# Patient Record
Sex: FEMALE | Race: WHITE | NOT HISPANIC OR LATINO | URBAN - METROPOLITAN AREA
[De-identification: names, ages, dates, MRNs, and addresses within clinical notes are randomized per-mention and may not be internally consistent; named-entity substitution may affect disease eponyms.]

---

## 2018-05-01 ENCOUNTER — OUTPATIENT (OUTPATIENT)
Dept: OUTPATIENT SERVICES | Facility: HOSPITAL | Age: 78
LOS: 1 days | End: 2018-05-01
Payer: COMMERCIAL

## 2018-05-01 VITALS
HEART RATE: 82 BPM | SYSTOLIC BLOOD PRESSURE: 155 MMHG | RESPIRATION RATE: 16 BRPM | WEIGHT: 167.99 LBS | TEMPERATURE: 99 F | DIASTOLIC BLOOD PRESSURE: 83 MMHG

## 2018-05-01 DIAGNOSIS — Z01.818 ENCOUNTER FOR OTHER PREPROCEDURAL EXAMINATION: ICD-10-CM

## 2018-05-01 DIAGNOSIS — M17.12 UNILATERAL PRIMARY OSTEOARTHRITIS, LEFT KNEE: ICD-10-CM

## 2018-05-01 DIAGNOSIS — Z90.81 ACQUIRED ABSENCE OF SPLEEN: Chronic | ICD-10-CM

## 2018-05-01 DIAGNOSIS — Z98.890 OTHER SPECIFIED POSTPROCEDURAL STATES: Chronic | ICD-10-CM

## 2018-05-01 DIAGNOSIS — Z41.9 ENCOUNTER FOR PROCEDURE FOR PURPOSES OTHER THAN REMEDYING HEALTH STATE, UNSPECIFIED: Chronic | ICD-10-CM

## 2018-05-01 LAB
ALBUMIN SERPL ELPH-MCNC: 3.4 G/DL — SIGNIFICANT CHANGE UP (ref 3.3–5)
ALP SERPL-CCNC: 87 U/L — SIGNIFICANT CHANGE UP (ref 40–120)
ALT FLD-CCNC: 26 U/L — SIGNIFICANT CHANGE UP (ref 12–78)
ANION GAP SERPL CALC-SCNC: 8 MMOL/L — SIGNIFICANT CHANGE UP (ref 5–17)
APTT BLD: 30.5 SEC — SIGNIFICANT CHANGE UP (ref 27.5–37.4)
AST SERPL-CCNC: 22 U/L — SIGNIFICANT CHANGE UP (ref 15–37)
BILIRUB SERPL-MCNC: 0.7 MG/DL — SIGNIFICANT CHANGE UP (ref 0.2–1.2)
BUN SERPL-MCNC: 11 MG/DL — SIGNIFICANT CHANGE UP (ref 7–23)
CALCIUM SERPL-MCNC: 8.5 MG/DL — SIGNIFICANT CHANGE UP (ref 8.5–10.1)
CHLORIDE SERPL-SCNC: 106 MMOL/L — SIGNIFICANT CHANGE UP (ref 96–108)
CO2 SERPL-SCNC: 26 MMOL/L — SIGNIFICANT CHANGE UP (ref 22–31)
CREAT SERPL-MCNC: 0.54 MG/DL — SIGNIFICANT CHANGE UP (ref 0.5–1.3)
GLUCOSE SERPL-MCNC: 85 MG/DL — SIGNIFICANT CHANGE UP (ref 70–99)
HCT VFR BLD CALC: 42.7 % — SIGNIFICANT CHANGE UP (ref 34.5–45)
HGB BLD-MCNC: 14.3 G/DL — SIGNIFICANT CHANGE UP (ref 11.5–15.5)
INR BLD: 1.05 RATIO — SIGNIFICANT CHANGE UP (ref 0.88–1.16)
MCHC RBC-ENTMCNC: 29.9 PG — SIGNIFICANT CHANGE UP (ref 27–34)
MCHC RBC-ENTMCNC: 33.6 GM/DL — SIGNIFICANT CHANGE UP (ref 32–36)
MCV RBC AUTO: 89 FL — SIGNIFICANT CHANGE UP (ref 80–100)
MRSA PCR RESULT.: SIGNIFICANT CHANGE UP
PLATELET # BLD AUTO: 84 K/UL — LOW (ref 150–400)
POTASSIUM SERPL-MCNC: 4 MMOL/L — SIGNIFICANT CHANGE UP (ref 3.5–5.3)
POTASSIUM SERPL-SCNC: 4 MMOL/L — SIGNIFICANT CHANGE UP (ref 3.5–5.3)
PROT SERPL-MCNC: 7.3 G/DL — SIGNIFICANT CHANGE UP (ref 6–8.3)
PROTHROM AB SERPL-ACNC: 11.5 SEC — SIGNIFICANT CHANGE UP (ref 9.8–12.7)
RBC # BLD: 4.8 M/UL — SIGNIFICANT CHANGE UP (ref 3.8–5.2)
RBC # FLD: 13.1 % — SIGNIFICANT CHANGE UP (ref 10.3–14.5)
S AUREUS DNA NOSE QL NAA+PROBE: SIGNIFICANT CHANGE UP
SODIUM SERPL-SCNC: 140 MMOL/L — SIGNIFICANT CHANGE UP (ref 135–145)
WBC # BLD: 10.7 K/UL — HIGH (ref 3.8–10.5)
WBC # FLD AUTO: 10.7 K/UL — HIGH (ref 3.8–10.5)

## 2018-05-01 PROCEDURE — 86850 RBC ANTIBODY SCREEN: CPT

## 2018-05-01 PROCEDURE — 85730 THROMBOPLASTIN TIME PARTIAL: CPT

## 2018-05-01 PROCEDURE — 80053 COMPREHEN METABOLIC PANEL: CPT

## 2018-05-01 PROCEDURE — 86901 BLOOD TYPING SEROLOGIC RH(D): CPT

## 2018-05-01 PROCEDURE — 85027 COMPLETE CBC AUTOMATED: CPT

## 2018-05-01 PROCEDURE — 87641 MR-STAPH DNA AMP PROBE: CPT

## 2018-05-01 PROCEDURE — 73560 X-RAY EXAM OF KNEE 1 OR 2: CPT | Mod: 26,LT

## 2018-05-01 PROCEDURE — G0463: CPT

## 2018-05-01 PROCEDURE — 93005 ELECTROCARDIOGRAM TRACING: CPT

## 2018-05-01 PROCEDURE — 73560 X-RAY EXAM OF KNEE 1 OR 2: CPT

## 2018-05-01 PROCEDURE — 93010 ELECTROCARDIOGRAM REPORT: CPT

## 2018-05-01 PROCEDURE — 86900 BLOOD TYPING SEROLOGIC ABO: CPT

## 2018-05-01 PROCEDURE — 87640 STAPH A DNA AMP PROBE: CPT

## 2018-05-01 PROCEDURE — 85610 PROTHROMBIN TIME: CPT

## 2018-05-01 RX ORDER — RISEDRONATE SODIUM 25.8; 4.2 MG/1; MG/1
0 TABLET, FILM COATED ORAL
Qty: 3 | Refills: 0 | COMMUNITY

## 2018-05-01 RX ORDER — PANTOPRAZOLE SODIUM 20 MG/1
0 TABLET, DELAYED RELEASE ORAL
Qty: 30 | Refills: 0 | COMMUNITY

## 2018-05-01 RX ORDER — LEVOTHYROXINE SODIUM 125 MCG
0 TABLET ORAL
Qty: 90 | Refills: 0 | COMMUNITY

## 2018-05-01 RX ORDER — AMLODIPINE BESYLATE 2.5 MG/1
0 TABLET ORAL
Qty: 30 | Refills: 0 | COMMUNITY

## 2018-05-01 NOTE — H&P PST ADULT - RS GEN PE MLT RESP DETAILS PC
good air movement/airway patent/normal/breath sounds equal/clear to auscultation bilaterally/respirations non-labored

## 2018-05-01 NOTE — H&P PST ADULT - HEMATOLOGY/LYMPHATICS COMMENTS
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

## 2018-05-01 NOTE — H&P PST ADULT - PMH
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

## 2018-05-01 NOTE — H&P PST ADULT - NEGATIVE CARDIOVASCULAR SYMPTOMS
no dyspnea on exertion/no chest pain/no orthopnea/no paroxysmal nocturnal dyspnea/no claudication/no palpitations/no peripheral edema

## 2018-05-01 NOTE — H&P PST ADULT - MUSCULOSKELETAL COMMENTS
See HPI, OA of right knee Left knee - decreased ROM, (+) mild edema of left knee, no erythema, (+) mild tenderness to palpation

## 2018-05-01 NOTE — H&P PST ADULT - PSH
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

## 2018-05-01 NOTE — H&P PST ADULT - PROBLEM SELECTOR PLAN 2
Medical clearance needed.   CBC, Comprehensive panel, PT/PTT, T&S, EKG, Nose culture and X-ray of left knee ordered.   Pre-op instructions and 3 days of surgical scrubs given and pt verbalized understanding.

## 2018-05-01 NOTE — H&P PST ADULT - LYMPHATIC
posterior cervical L/posterior cervical R/supraclavicular L/anterior cervical L/anterior cervical R/supraclavicular R

## 2018-05-01 NOTE — H&P PST ADULT - NSANTHOSAYNRD_GEN_A_CORE
No. RUDY screening performed.  STOP BANG Legend: 0-2 = LOW Risk; 3-4 = INTERMEDIATE Risk; 5-8 = HIGH Risk

## 2018-05-01 NOTE — H&P PST ADULT - HISTORY OF PRESENT ILLNESS
78yo female with PMH of HTN and ITP here for PST. Pt first started to have left knee pain about 3 years ago and pt was receiving gel injection last in 1/2018. Pt diagnosed with OA of left knee. Pt also complaining of sciatic pain of left leg. Pt rates left knee pain to be 8/10 and pt taking Advil PRN with moderate pain relief. Pt denies left knee swelling and limping gait. Pt electing for left total knee replacement on 5/16/18.

## 2018-05-12 RX ORDER — RISEDRONATE SODIUM 25.8; 4.2 MG/1; MG/1
0 TABLET, FILM COATED ORAL
Qty: 0 | Refills: 0 | COMMUNITY
Start: 2018-05-12

## 2018-05-15 RX ORDER — HYDROMORPHONE HYDROCHLORIDE 2 MG/ML
4 INJECTION INTRAMUSCULAR; INTRAVENOUS; SUBCUTANEOUS EVERY 4 HOURS
Qty: 0 | Refills: 0 | Status: DISCONTINUED | OUTPATIENT
Start: 2018-05-16 | End: 2018-05-19

## 2018-05-15 RX ORDER — CELECOXIB 200 MG/1
200 CAPSULE ORAL
Qty: 0 | Refills: 0 | Status: DISCONTINUED | OUTPATIENT
Start: 2018-05-18 | End: 2018-05-19

## 2018-05-15 RX ORDER — FERROUS SULFATE 325(65) MG
325 TABLET ORAL
Qty: 0 | Refills: 0 | Status: DISCONTINUED | OUTPATIENT
Start: 2018-05-16 | End: 2018-05-19

## 2018-05-15 RX ORDER — AMLODIPINE BESYLATE 2.5 MG/1
5 TABLET ORAL DAILY
Qty: 0 | Refills: 0 | Status: DISCONTINUED | OUTPATIENT
Start: 2018-05-16 | End: 2018-05-19

## 2018-05-15 RX ORDER — SENNA PLUS 8.6 MG/1
2 TABLET ORAL AT BEDTIME
Qty: 0 | Refills: 0 | Status: DISCONTINUED | OUTPATIENT
Start: 2018-05-16 | End: 2018-05-19

## 2018-05-15 RX ORDER — ONDANSETRON 8 MG/1
4 TABLET, FILM COATED ORAL EVERY 6 HOURS
Qty: 0 | Refills: 0 | Status: DISCONTINUED | OUTPATIENT
Start: 2018-05-16 | End: 2018-05-19

## 2018-05-15 RX ORDER — ACETAMINOPHEN 500 MG
650 TABLET ORAL EVERY 6 HOURS
Qty: 0 | Refills: 0 | Status: DISCONTINUED | OUTPATIENT
Start: 2018-05-16 | End: 2018-05-19

## 2018-05-15 RX ORDER — MORPHINE SULFATE 50 MG/1
4 CAPSULE, EXTENDED RELEASE ORAL ONCE
Qty: 0 | Refills: 0 | Status: DISCONTINUED | OUTPATIENT
Start: 2018-05-16 | End: 2018-05-19

## 2018-05-15 RX ORDER — DOCUSATE SODIUM 100 MG
100 CAPSULE ORAL THREE TIMES A DAY
Qty: 0 | Refills: 0 | Status: DISCONTINUED | OUTPATIENT
Start: 2018-05-16 | End: 2018-05-19

## 2018-05-15 RX ORDER — ASCORBIC ACID 60 MG
500 TABLET,CHEWABLE ORAL
Qty: 0 | Refills: 0 | Status: DISCONTINUED | OUTPATIENT
Start: 2018-05-16 | End: 2018-05-19

## 2018-05-15 RX ORDER — SODIUM CHLORIDE 9 MG/ML
1000 INJECTION, SOLUTION INTRAVENOUS
Qty: 0 | Refills: 0 | Status: DISCONTINUED | OUTPATIENT
Start: 2018-05-16 | End: 2018-05-16

## 2018-05-15 RX ORDER — HYDROMORPHONE HYDROCHLORIDE 2 MG/ML
2 INJECTION INTRAMUSCULAR; INTRAVENOUS; SUBCUTANEOUS EVERY 4 HOURS
Qty: 0 | Refills: 0 | Status: DISCONTINUED | OUTPATIENT
Start: 2018-05-16 | End: 2018-05-19

## 2018-05-15 RX ORDER — ASPIRIN/CALCIUM CARB/MAGNESIUM 324 MG
325 TABLET ORAL
Qty: 0 | Refills: 0 | Status: DISCONTINUED | OUTPATIENT
Start: 2018-05-17 | End: 2018-05-19

## 2018-05-15 RX ORDER — FOLIC ACID 0.8 MG
1 TABLET ORAL DAILY
Qty: 0 | Refills: 0 | Status: DISCONTINUED | OUTPATIENT
Start: 2018-05-16 | End: 2018-05-19

## 2018-05-15 RX ORDER — LEVOTHYROXINE SODIUM 125 MCG
50 TABLET ORAL DAILY
Qty: 0 | Refills: 0 | Status: DISCONTINUED | OUTPATIENT
Start: 2018-05-16 | End: 2018-05-19

## 2018-05-16 ENCOUNTER — INPATIENT (INPATIENT)
Facility: HOSPITAL | Age: 78
LOS: 2 days | Discharge: HOME | DRG: 470 | End: 2018-05-19
Attending: ORTHOPAEDIC SURGERY | Admitting: ORTHOPAEDIC SURGERY
Payer: COMMERCIAL

## 2018-05-16 VITALS
HEART RATE: 76 BPM | RESPIRATION RATE: 21 BRPM | WEIGHT: 167.99 LBS | DIASTOLIC BLOOD PRESSURE: 82 MMHG | SYSTOLIC BLOOD PRESSURE: 165 MMHG | TEMPERATURE: 98 F | OXYGEN SATURATION: 97 %

## 2018-05-16 DIAGNOSIS — E03.9 HYPOTHYROIDISM, UNSPECIFIED: ICD-10-CM

## 2018-05-16 DIAGNOSIS — M17.12 UNILATERAL PRIMARY OSTEOARTHRITIS, LEFT KNEE: ICD-10-CM

## 2018-05-16 DIAGNOSIS — K21.9 GASTRO-ESOPHAGEAL REFLUX DISEASE WITHOUT ESOPHAGITIS: ICD-10-CM

## 2018-05-16 DIAGNOSIS — Z41.9 ENCOUNTER FOR PROCEDURE FOR PURPOSES OTHER THAN REMEDYING HEALTH STATE, UNSPECIFIED: Chronic | ICD-10-CM

## 2018-05-16 DIAGNOSIS — Z98.890 OTHER SPECIFIED POSTPROCEDURAL STATES: Chronic | ICD-10-CM

## 2018-05-16 DIAGNOSIS — Z90.81 ACQUIRED ABSENCE OF SPLEEN: Chronic | ICD-10-CM

## 2018-05-16 DIAGNOSIS — I10 ESSENTIAL (PRIMARY) HYPERTENSION: ICD-10-CM

## 2018-05-16 LAB
ABO RH CONFIRMATION: SIGNIFICANT CHANGE UP
HCT VFR BLD CALC: 38 % — SIGNIFICANT CHANGE UP (ref 34.5–45)
HGB BLD-MCNC: 12.4 G/DL — SIGNIFICANT CHANGE UP (ref 11.5–15.5)

## 2018-05-16 PROCEDURE — 73562 X-RAY EXAM OF KNEE 3: CPT | Mod: 26,LT

## 2018-05-16 PROCEDURE — 88311 DECALCIFY TISSUE: CPT | Mod: 26

## 2018-05-16 PROCEDURE — 88305 TISSUE EXAM BY PATHOLOGIST: CPT | Mod: 26

## 2018-05-16 RX ORDER — LEVOTHYROXINE SODIUM 125 MCG
0 TABLET ORAL
Qty: 0 | Refills: 0 | COMMUNITY

## 2018-05-16 RX ORDER — OXYCODONE HYDROCHLORIDE 5 MG/1
5 TABLET ORAL EVERY 4 HOURS
Qty: 0 | Refills: 0 | Status: DISCONTINUED | OUTPATIENT
Start: 2018-05-16 | End: 2018-05-16

## 2018-05-16 RX ORDER — CEFAZOLIN SODIUM 1 G
2000 VIAL (EA) INJECTION ONCE
Qty: 0 | Refills: 0 | Status: COMPLETED | OUTPATIENT
Start: 2018-05-16 | End: 2018-05-16

## 2018-05-16 RX ORDER — ACETAMINOPHEN 500 MG
1000 TABLET ORAL ONCE
Qty: 0 | Refills: 0 | Status: COMPLETED | OUTPATIENT
Start: 2018-05-17 | End: 2018-05-17

## 2018-05-16 RX ORDER — ACETAMINOPHEN 500 MG
1000 TABLET ORAL DAILY
Qty: 0 | Refills: 0 | Status: COMPLETED | OUTPATIENT
Start: 2018-05-16 | End: 2018-05-17

## 2018-05-16 RX ORDER — ONDANSETRON 8 MG/1
4 TABLET, FILM COATED ORAL ONCE
Qty: 0 | Refills: 0 | Status: DISCONTINUED | OUTPATIENT
Start: 2018-05-16 | End: 2018-05-16

## 2018-05-16 RX ORDER — MULTIVIT-MIN/FERROUS GLUCONATE 9 MG/15 ML
1 LIQUID (ML) ORAL
Qty: 0 | Refills: 0 | COMMUNITY

## 2018-05-16 RX ORDER — OMEGA-3 ACID ETHYL ESTERS 1 G
0 CAPSULE ORAL
Qty: 0 | Refills: 0 | COMMUNITY

## 2018-05-16 RX ORDER — SODIUM CHLORIDE 9 MG/ML
1000 INJECTION, SOLUTION INTRAVENOUS
Qty: 0 | Refills: 0 | Status: DISCONTINUED | OUTPATIENT
Start: 2018-05-16 | End: 2018-05-17

## 2018-05-16 RX ORDER — PANTOPRAZOLE SODIUM 20 MG/1
0 TABLET, DELAYED RELEASE ORAL
Qty: 0 | Refills: 0 | COMMUNITY

## 2018-05-16 RX ORDER — BUPIVACAINE 13.3 MG/ML
20 INJECTION, SUSPENSION, LIPOSOMAL INFILTRATION ONCE
Qty: 0 | Refills: 0 | Status: DISCONTINUED | OUTPATIENT
Start: 2018-05-16 | End: 2018-05-16

## 2018-05-16 RX ORDER — HYDROMORPHONE HYDROCHLORIDE 2 MG/ML
0.5 INJECTION INTRAMUSCULAR; INTRAVENOUS; SUBCUTANEOUS
Qty: 0 | Refills: 0 | Status: DISCONTINUED | OUTPATIENT
Start: 2018-05-16 | End: 2018-05-16

## 2018-05-16 RX ORDER — ACETAMINOPHEN 500 MG
1000 TABLET ORAL ONCE
Qty: 0 | Refills: 0 | Status: COMPLETED | OUTPATIENT
Start: 2018-05-16 | End: 2018-05-16

## 2018-05-16 RX ORDER — CEFAZOLIN SODIUM 1 G
2000 VIAL (EA) INJECTION EVERY 8 HOURS
Qty: 0 | Refills: 0 | Status: COMPLETED | OUTPATIENT
Start: 2018-05-16 | End: 2018-05-16

## 2018-05-16 RX ORDER — AMLODIPINE BESYLATE 2.5 MG/1
0 TABLET ORAL
Qty: 0 | Refills: 0 | COMMUNITY

## 2018-05-16 RX ORDER — SODIUM CHLORIDE 9 MG/ML
1000 INJECTION, SOLUTION INTRAVENOUS
Qty: 0 | Refills: 0 | Status: DISCONTINUED | OUTPATIENT
Start: 2018-05-16 | End: 2018-05-16

## 2018-05-16 RX ADMIN — Medication 500 MILLIGRAM(S): at 18:20

## 2018-05-16 RX ADMIN — Medication 325 MILLIGRAM(S): at 13:44

## 2018-05-16 RX ADMIN — Medication 100 MILLIGRAM(S): at 21:31

## 2018-05-16 RX ADMIN — Medication 400 MILLIGRAM(S): at 18:20

## 2018-05-16 RX ADMIN — SODIUM CHLORIDE 75 MILLILITER(S): 9 INJECTION, SOLUTION INTRAVENOUS at 06:43

## 2018-05-16 RX ADMIN — SODIUM CHLORIDE 100 MILLILITER(S): 9 INJECTION, SOLUTION INTRAVENOUS at 09:43

## 2018-05-16 RX ADMIN — Medication 100 MILLIGRAM(S): at 16:19

## 2018-05-16 RX ADMIN — HYDROMORPHONE HYDROCHLORIDE 0.5 MILLIGRAM(S): 2 INJECTION INTRAMUSCULAR; INTRAVENOUS; SUBCUTANEOUS at 10:00

## 2018-05-16 RX ADMIN — Medication 1 MILLIGRAM(S): at 13:44

## 2018-05-16 RX ADMIN — HYDROMORPHONE HYDROCHLORIDE 0.5 MILLIGRAM(S): 2 INJECTION INTRAMUSCULAR; INTRAVENOUS; SUBCUTANEOUS at 10:09

## 2018-05-16 RX ADMIN — Medication 100 MILLIGRAM(S): at 23:32

## 2018-05-16 RX ADMIN — Medication 325 MILLIGRAM(S): at 18:20

## 2018-05-16 RX ADMIN — Medication 100 MILLIGRAM(S): at 13:45

## 2018-05-16 NOTE — PATIENT PROFILE ADULT. - FAMILY HISTORY
Father  Still living? No  CAD (coronary artery disease), Age at diagnosis: Age Unknown     Mother  Still living? No  Family history of ovarian cancer, Age at diagnosis: Age Unknown

## 2018-05-16 NOTE — CONSULT NOTE ADULT - SUBJECTIVE AND OBJECTIVE BOX
Patient is a 77y old  Female who presents with a chief complaint of "Left knee replacement" (16 May 2018 06:23)  feels very well presently,  without any complaints      INTERVAL HPI/OVERNIGHT EVENTS:  T(C): 36.7 (05-16-18 @ 12:42), Max: 36.7 (05-16-18 @ 10:55)  HR: 69 (05-16-18 @ 12:42) (66 - 84)  BP: 123/65 (05-16-18 @ 12:42) (118/50 - 165/82)  RR: 16 (05-16-18 @ 12:42) (12 - 21)  SpO2: 99% (05-16-18 @ 12:42) (96% - 99%)  Wt(kg): --  I&O's Summary    16 May 2018 07:01  -  16 May 2018 13:18  --------------------------------------------------------  IN: 240 mL / OUT: 0 mL / NET: 240 mL        PAST MEDICAL & SURGICAL HISTORY:  Osteoporosis  Hypothyroidism  Hypertension  GERD (gastroesophageal reflux disease)  Idiopathic thrombocythemia: (As per pt, platelet count usually runs from 85-90K, 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 and does not follow up with hematologist)  Unilateral primary osteoarthritis, left knee  Elective surgery: (Partial hysterectomy, Age 42)  History of colonoscopy  S/P arthroscopic knee surgery: (Right, 2017)  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      SOCIAL HISTORY  Alcohol: neg  Tobacco: neg  Illicit substance use:  neg    FAMILY HISTORY:    Home Medications:  Advil 200 mg oral tablet: 1 tab(s) orally every 6 hours, As Needed (16 May 2018 06:23)  AMLODIPINE BESYLATE 5 MG TAB: orally once a day (16 May 2018 06:23)  aspirin 81 mg oral tablet: 1 tab(s) orally once a day (16 May 2018 06:23)  biotin 1000 mcg oral tablet: 1 tab(s) orally once a day (16 May 2018 06:23)  Centrum oral tablet: 1 tab(s) orally once a day (16 May 2018 06:23)  Citracal 250 mg + D 200 intl units oral tablet: orally once a day (16 May 2018 06:23)  Fish Oil oral capsule: orally once a day (16 May 2018 06:23)  LEVOTHYROXINE 50 MCG TABLET: orally once a day (16 May 2018 06:23)  PANTOPRAZOLE SOD DR 40 MG TAB: orally once a day (16 May 2018 06:23)  PreserVision AREDS 2 oral capsule: 1 cap(s) orally 2 times a day (16 May 2018 06:23)  RISEDRONATE SODIUM 150 MG TAB: orally once a month (16 May 2018 06:23)        LABS:                        12.4   x     )-----------( x        ( 16 May 2018 10:00 )             38.0                 MEDICATIONS  (STANDING):  acetaminophen  IVPB. 1000 milliGRAM(s) IV Intermittent once  amLODIPine   Tablet 5 milliGRAM(s) Oral daily  ascorbic acid 500 milliGRAM(s) Oral two times a day  ceFAZolin   IVPB 2000 milliGRAM(s) IV Intermittent every 8 hours  docusate sodium 100 milliGRAM(s) Oral three times a day  ferrous    sulfate 325 milliGRAM(s) Oral three times a day with meals  folic acid 1 milliGRAM(s) Oral daily  lactated ringers. 1000 milliLiter(s) (100 mL/Hr) IV Continuous <Continuous>  levothyroxine 50 MICROGram(s) Oral daily  morphine  - Injectable 4 milliGRAM(s) IV Push once    MEDICATIONS  (PRN):  acetaminophen   Tablet 650 milliGRAM(s) Oral every 6 hours PRN For Temp over 38.3 C (100.94 F)  aluminum hydroxide/magnesium hydroxide/simethicone Suspension 30 milliLiter(s) Oral four times a day PRN Indigestion  HYDROmorphone   Tablet 2 milliGRAM(s) Oral every 4 hours PRN Moderate Pain (4 - 6)  HYDROmorphone   Tablet 4 milliGRAM(s) Oral every 4 hours PRN Severe Pain (7 - 10)  ondansetron Injectable 4 milliGRAM(s) IV Push every 6 hours PRN Nausea and/or Vomiting  senna 2 Tablet(s) Oral at bedtime PRN Constipation      REVIEW OF SYSTEMS:  CONSTITUTIONAL: No fever, weight loss, or fatigue  EYES: No eye pain, visual disturbances, or discharge  ENMT:  No difficulty hearing, tinnitus, vertigo; No sinus or throat pain  NECK: No pain or stiffness  RESPIRATORY: No cough, wheezing, chills or hemoptysis; No shortness of breath  CARDIOVASCULAR: No chest pain, palpitations, dizziness, or leg swelling  GASTROINTESTINAL: No abdominal or epigastric pain. No nausea, vomiting, or hematemesis; No diarrhea or constipation. No melena or hematochezia.  GENITOURINARY: No dysuria, frequency, hematuria, or incontinence  NEUROLOGICAL: No headaches, memory loss, loss of strength, numbness, or tremors  SKIN: No itching, burning, rashes, or lesions   LYMPH NODES: No enlarged glands  ENDOCRINE: No heat or cold intolerance; No hair loss  MUSCULOSKELETAL: chronic bilateral knee pain  PSYCHIATRIC: No depression, anxiety, mood swings, or difficulty sleeping  HEME/LYMPH: No easy bruising, or bleeding gums  ALLERY AND IMMUNOLOGIC: No hives or eczema    RADIOLOGY & ADDITIONAL TESTS:    Imaging Personally Reviewed:  [y ] YES  [ ] NO    Consultant(s) Notes Reviewed:  [y ] YES  [ ] NO        PHYSICAL EXAM:  GENERAL: NAD, well-groomed, well-developed  HEAD:  Atraumatic, Normocephalic  EYES: EOMI, PERRLA, conjunctiva and sclera clear  ENMT: No tonsillar erythema, exudates, or enlargement; Moist mucous membranes, Good dentition, No lesions  NECK: Supple, No JVD, Normal thyroid  NERVOUS SYSTEM:  Alert & Oriented X3, Good concentration; Motor Strength 5/5 B/L upper and lower extremities; DTRs 2+ intact and symmetric  CHEST/LUNG: Clear to percussion bilaterally; No rales, rhonchi, wheezing, or rubs  HEART: Regular rate and rhythm; No murmurs, rubs, or gallops  ABDOMEN: Soft, Nontender, Nondistended; Bowel sounds present  EXTREMITIES:  2+ Peripheral Pulses, No clubbing, cyanosis, or edema, no calf tenderness to palp  LYMPH: No lymphadenopathy noted  SKIN: No rashes or lesions    Care Discussed with Consultants/Other Providers [ ] YES  [ ] NO

## 2018-05-16 NOTE — PROGRESS NOTE ADULT - ASSESSMENT
A/P: 77F s/p L TKA POD 0  Pain Control  DVT ppx  WBAT  PT/OT  Incentive Spirometry  Medical management appreciated  DC planning

## 2018-05-16 NOTE — BRIEF OPERATIVE NOTE - PROCEDURE
<<-----Click on this checkbox to enter Procedure Total knee replacement  05/16/2018  LEFT  Active  [id number]

## 2018-05-16 NOTE — PROGRESS NOTE ADULT - SUBJECTIVE AND OBJECTIVE BOX
Post-op check  Pt S/E at bedside, pain controlled    Vital Signs Last 24 Hrs  T(C): 36.7 (16 May 2018 15:49), Max: 36.7 (16 May 2018 10:55)  T(F): 98.1 (16 May 2018 15:49), Max: 98.1 (16 May 2018 10:55)  HR: 73 (16 May 2018 15:49) (66 - 84)  BP: 130/69 (16 May 2018 15:49) (118/50 - 165/82)  BP(mean): --  RR: 16 (16 May 2018 15:49) (12 - 21)  SpO2: 99% (16 May 2018 15:49) (96% - 99%)    Gen: NAD,     Left Lower Extremity:  Dressing clean dry intact  +EHL/FHL/TA/GS  SILT L3-S1  +DP/PT Pulses  Compartments soft  No calf TTP B/L

## 2018-05-17 LAB
ANION GAP SERPL CALC-SCNC: 7 MMOL/L — SIGNIFICANT CHANGE UP (ref 5–17)
BUN SERPL-MCNC: 14 MG/DL — SIGNIFICANT CHANGE UP (ref 7–23)
CALCIUM SERPL-MCNC: 7.7 MG/DL — LOW (ref 8.5–10.1)
CHLORIDE SERPL-SCNC: 108 MMOL/L — SIGNIFICANT CHANGE UP (ref 96–108)
CO2 SERPL-SCNC: 27 MMOL/L — SIGNIFICANT CHANGE UP (ref 22–31)
CREAT SERPL-MCNC: 0.53 MG/DL — SIGNIFICANT CHANGE UP (ref 0.5–1.3)
GLUCOSE SERPL-MCNC: 122 MG/DL — HIGH (ref 70–99)
HCT VFR BLD CALC: 33 % — LOW (ref 34.5–45)
HGB BLD-MCNC: 11.2 G/DL — LOW (ref 11.5–15.5)
POTASSIUM SERPL-MCNC: 4.7 MMOL/L — SIGNIFICANT CHANGE UP (ref 3.5–5.3)
POTASSIUM SERPL-SCNC: 4.7 MMOL/L — SIGNIFICANT CHANGE UP (ref 3.5–5.3)
SODIUM SERPL-SCNC: 142 MMOL/L — SIGNIFICANT CHANGE UP (ref 135–145)

## 2018-05-17 PROCEDURE — 93971 EXTREMITY STUDY: CPT | Mod: 26,LT

## 2018-05-17 RX ADMIN — Medication 325 MILLIGRAM(S): at 18:35

## 2018-05-17 RX ADMIN — Medication 325 MILLIGRAM(S): at 06:47

## 2018-05-17 RX ADMIN — Medication 1 MILLIGRAM(S): at 12:06

## 2018-05-17 RX ADMIN — Medication 325 MILLIGRAM(S): at 12:06

## 2018-05-17 RX ADMIN — Medication 400 MILLIGRAM(S): at 12:06

## 2018-05-17 RX ADMIN — HYDROMORPHONE HYDROCHLORIDE 4 MILLIGRAM(S): 2 INJECTION INTRAMUSCULAR; INTRAVENOUS; SUBCUTANEOUS at 14:42

## 2018-05-17 RX ADMIN — Medication 500 MILLIGRAM(S): at 18:35

## 2018-05-17 RX ADMIN — Medication 100 MILLIGRAM(S): at 06:47

## 2018-05-17 RX ADMIN — HYDROMORPHONE HYDROCHLORIDE 2 MILLIGRAM(S): 2 INJECTION INTRAMUSCULAR; INTRAVENOUS; SUBCUTANEOUS at 04:34

## 2018-05-17 RX ADMIN — Medication 500 MILLIGRAM(S): at 06:47

## 2018-05-17 RX ADMIN — HYDROMORPHONE HYDROCHLORIDE 4 MILLIGRAM(S): 2 INJECTION INTRAMUSCULAR; INTRAVENOUS; SUBCUTANEOUS at 18:35

## 2018-05-17 RX ADMIN — Medication 100 MILLIGRAM(S): at 14:44

## 2018-05-17 RX ADMIN — HYDROMORPHONE HYDROCHLORIDE 2 MILLIGRAM(S): 2 INJECTION INTRAMUSCULAR; INTRAVENOUS; SUBCUTANEOUS at 09:28

## 2018-05-17 RX ADMIN — Medication 400 MILLIGRAM(S): at 02:39

## 2018-05-17 RX ADMIN — Medication 50 MICROGRAM(S): at 05:59

## 2018-05-17 RX ADMIN — Medication 325 MILLIGRAM(S): at 09:30

## 2018-05-17 RX ADMIN — HYDROMORPHONE HYDROCHLORIDE 4 MILLIGRAM(S): 2 INJECTION INTRAMUSCULAR; INTRAVENOUS; SUBCUTANEOUS at 23:49

## 2018-05-17 RX ADMIN — HYDROMORPHONE HYDROCHLORIDE 2 MILLIGRAM(S): 2 INJECTION INTRAMUSCULAR; INTRAVENOUS; SUBCUTANEOUS at 05:00

## 2018-05-17 RX ADMIN — Medication 1000 MILLIGRAM(S): at 03:01

## 2018-05-17 RX ADMIN — Medication 100 MILLIGRAM(S): at 21:47

## 2018-05-17 RX ADMIN — AMLODIPINE BESYLATE 5 MILLIGRAM(S): 2.5 TABLET ORAL at 06:47

## 2018-05-17 NOTE — DISCHARGE NOTE ADULT - CARE PROVIDER_API CALL
Jose Tran), Orthopaedic Surgery  [address] [place] [address]  Phone: [phone]  Fax: [phone]

## 2018-05-17 NOTE — DISCHARGE NOTE ADULT - PLAN OF CARE
RECOVER FROM SURGERY, PHYSICAL THERAPY WEIGHT BEARING AS TOLERATED.  FOLLOW UP WITH DR. PAYTON THURSDAY 05/31/2018 CALL [phone] FOR AN APPOINTMENT.  KEEP KNEE AQUACEL  DRESSING  ON DRY AND CLEAN, IT WILL BE CHANGED OR REMOVED ON YOUR NEXT OFFICE VISIT.

## 2018-05-17 NOTE — DISCHARGE NOTE ADULT - MEDICATION SUMMARY - MEDICATIONS TO TAKE
I will START or STAY ON the medications listed below when I get home from the hospital:    celecoxib 200 mg oral capsule  -- 1 cap(s) by mouth 2 times a day  -- Indication: For PAIN     HYDROmorphone 2 mg oral tablet  -- 1 tab(s) by mouth every 6 hours, As Needed -for severe pain MDD:4  -- Indication: For PAIN     aspirin 325 mg oral delayed release tablet  -- 1 tab(s) by mouth 2 times a day   -- Indication: For DVT PROPHYLAXIS , PREVENT BLOOD CLOT IN LEGS      RISEDRONATE SODIUM 150 MG TAB  -- orally once a month  -- Indication: For HOME MEDICATION     AMLODIPINE BESYLATE 5 MG TAB  -- orally once a day  -- Indication: For HOME MEDICATION     Fish Oil oral capsule  -- orally once a day  -- Indication: For HOME MEDICATION     PANTOPRAZOLE SOD DR 40 MG TAB  -- orally once a day  -- Indication: For HOME MEDICATION     LEVOTHYROXINE 50 MCG TABLET  -- orally once a day  -- Indication: For HOME MEDICATION     Citracal 250 mg + D 200 intl units oral tablet  -- orally once a day  -- Indication: For HOME MEDICATION     Centrum oral tablet  -- 1 tab(s) by mouth once a day  -- Indication: For HOME MEDICATION     PreserVision AREDS 2 oral capsule  -- 1 cap(s) by mouth 2 times a day  -- Indication: For HOME MEDICATION     biotin 1000 mcg oral tablet  -- 1 tab(s) by mouth once a day  -- Indication: For HOME MEDICATION

## 2018-05-17 NOTE — DISCHARGE NOTE ADULT - MEDICATION SUMMARY - MEDICATIONS TO STOP TAKING
I will STOP taking the medications listed below when I get home from the hospital:    aspirin 81 mg oral tablet  -- 1 tab(s) by mouth once a day    Advil 200 mg oral tablet  -- 1 tab(s) by mouth every 6 hours, As Needed

## 2018-05-17 NOTE — DISCHARGE NOTE ADULT - HOSPITAL COURSE
THIS IS A CASE OF A 78 YO FEMALE EVALUATED IN THE OFFICE DUE TO LEFT KNEE PAIN.    PAST MEDICAL HISTORY: HTN, HYPOTHYROIDISM, OSTEOPOROSIS, GERD, IDIOPATHIC THROMBOCYTOPENIA POST SPLENECTOMY, OA        HOSPITAL COURSE: AFTER THE RISK AND BENEFITS OF SURGICAL INTERVENTION IN DETAILS WERE DISCUSSED WITH THE PATIENT, A CONSENT WAS OBTAINED. AFTER OBTAINING MEDICAL CLEARANCE AND PREOPERATIVE EVALUATION, THE PATIENT WAS TAKEN TO THE OPERATING ROOM ON 05/16/2018 AND THE PATIENT UNDERWENT A  LEFT TOTAL KNEE REPLACEMENT. POSTOPERATIVE PHASE, THE PATIENT WAS ANTICOAGULATED WITH ASPIRIN AND WAS GIVEN 24 HOURS OF IV ANTIBIOTICS. A SOCIAL SERVICE CONSULT WAS OBTAINED FOR DISCHARGE PLANNING. A PHYSICAL THERAPY CONSULT WAS OBTAINED FOR  WEIGHT BEARING AS TOLERATED.   DUE TO ANEMIA OF ACUTE BLOOD LOSS POST OP  IRON SUPPLEMENT GIVEN.     DISPOSITION : HOME WITH HOME CARE  AND FOLLOW UP WITH DR. PAYTON AS OUTPATIENT.

## 2018-05-17 NOTE — DISCHARGE NOTE ADULT - CARE PLAN
Principal Discharge DX:	Unilateral primary osteoarthritis, left knee  Goal:	RECOVER FROM SURGERY, PHYSICAL THERAPY  Assessment and plan of treatment:	WEIGHT BEARING AS TOLERATED.  FOLLOW UP WITH DR. PAYTON THURSDAY 05/31/2018 CALL [phone] FOR AN APPOINTMENT.  KEEP KNEE AQUACEL  DRESSING  ON DRY AND CLEAN, IT WILL BE CHANGED OR REMOVED ON YOUR NEXT OFFICE VISIT.

## 2018-05-17 NOTE — PROGRESS NOTE ADULT - SUBJECTIVE AND OBJECTIVE BOX
DEPARTMENT OF ANESTHESIA  POST ANESTHETIC EVALUATION    The Patient was interviewed and evaluated.  The patient is S/P a left total knee    Vital Signs Last 24 Hrs  T(C): 37.1 (17 May 2018 07:25), Max: 37.1 (17 May 2018 07:25)  T(F): 98.7 (17 May 2018 07:25), Max: 98.7 (17 May 2018 07:25)  HR: 69 (17 May 2018 07:25) (66 - 84)  BP: 131/70 (17 May 2018 07:25) (118/50 - 144/80)  BP(mean): --  RR: 16 (17 May 2018 07:25) (12 - 20)  SpO2: 94% (17 May 2018 07:25) (94% - 99%)    Evaluation:  The patient is doing well.      (x ) No apparent complications or complaints regarding anesthesia care at this time  (x) All questions were answered    Condition:  (x Stable      ( ) Guarded      ( ) Critical    Recommendations:  (x None     ( ) Other:

## 2018-05-17 NOTE — PROGRESS NOTE ADULT - SUBJECTIVE AND OBJECTIVE BOX
VANDANAJAUN PABLO HAHN 77y Female  MRN-[id number]     ORTHOPEDIC SURGERY / DR. PAYTON    POD # 1    Vital Signs Last 24 Hrs  T(C): 36.7 (17 May 2018 04:52), Max: 36.7 (16 May 2018 10:55)  T(F): 98.1 (17 May 2018 04:52), Max: 98.1 (16 May 2018 10:55)  HR: 72 (17 May 2018 04:52) (66 - 84)  BP: 132/85 (17 May 2018 04:52) (118/50 - 165/82)  BP(mean): --  RR: 16 (17 May 2018 04:52) (12 - 21)  SpO2: 95% (17 May 2018 04:52) (95% - 99%)    LEFT KNEE :    DRESSING DRY AND INTACT  GOOD MOTOR TO LEFT LOWER EXTREMITY  NEURO-VASCULAR STATUS INTACT  NO CALF TENDERNESS    Hemoglobin: 11.2 (05-17 @ 04:46)  Hemoglobin: 12.4 (05-16 @ 10:00)    Hematocrit: 33.0 (05-17 @ 04:46)  Hematocrit: 38.0 (05-16 @ 10:00)    05-17    142  |  108  |  14  ----------------------------<  122<H>  4.7   |  27  |  0.53    Ca    7.7<L>      17 May 2018 04:45    ASSESSMENT &  PLAN:  POD # 1  S/P LEFT TOTAL KNEE  REPLACEMENT    WEIGHT  BEARING AS TOLERATED, OOB AND AMBULATE, PHYSICAL THERAPY   DVT PROPHYLAXIS  MG PO BID  INCENTIVE SPIROMETRY   DISCHARGE PLANNING TO HOME WITH HOME CARE / REHAB PENDING PT EVALUATION TODAY

## 2018-05-17 NOTE — PROGRESS NOTE ADULT - SUBJECTIVE AND OBJECTIVE BOX
Patient is a 77y old  Female who presents with a chief complaint of "Left knee replacement" (16 May 2018 06:23)  complains of left calf pain post PT  denies chest pain or shortness of breath      INTERVAL HPI/OVERNIGHT EVENTS:  T(C): 37.1 (05-17-18 @ 14:23), Max: 37.1 (05-17-18 @ 07:25)  HR: 79 (05-17-18 @ 14:23) (69 - 79)  BP: 119/72 (05-17-18 @ 14:23) (119/72 - 144/80)  RR: 16 (05-17-18 @ 14:23) (16 - 16)  SpO2: 95% (05-17-18 @ 14:23) (94% - 99%)  Wt(kg): --  I&O's Summary    16 May 2018 07:01  -  17 May 2018 07:00  --------------------------------------------------------  IN: 360 mL / OUT: 1450 mL / NET: -1090 mL        LABS:                        11.2   x     )-----------( x        ( 17 May 2018 04:46 )             33.0     05-17    142  |  108  |  14  ----------------------------<  122<H>  4.7   |  27  |  0.53    Ca    7.7<L>      17 May 2018 04:45          MEDICATIONS  (STANDING):  amLODIPine   Tablet 5 milliGRAM(s) Oral daily  ascorbic acid 500 milliGRAM(s) Oral two times a day  aspirin enteric coated 325 milliGRAM(s) Oral two times a day  docusate sodium 100 milliGRAM(s) Oral three times a day  ferrous    sulfate 325 milliGRAM(s) Oral three times a day with meals  folic acid 1 milliGRAM(s) Oral daily  levothyroxine 50 MICROGram(s) Oral daily  morphine  - Injectable 4 milliGRAM(s) IV Push once    MEDICATIONS  (PRN):  acetaminophen   Tablet 650 milliGRAM(s) Oral every 6 hours PRN For Temp over 38.3 C (100.94 F)  aluminum hydroxide/magnesium hydroxide/simethicone Suspension 30 milliLiter(s) Oral four times a day PRN Indigestion  HYDROmorphone   Tablet 2 milliGRAM(s) Oral every 4 hours PRN Moderate Pain (4 - 6)  HYDROmorphone   Tablet 4 milliGRAM(s) Oral every 4 hours PRN Severe Pain (7 - 10)  ondansetron Injectable 4 milliGRAM(s) IV Push every 6 hours PRN Nausea and/or Vomiting  senna 2 Tablet(s) Oral at bedtime PRN Constipation      REVIEW OF SYSTEMS:  CONSTITUTIONAL: No fever, weight loss, or fatigue  EYES: No eye pain, visual disturbances, or discharge  ENMT:  No difficulty hearing, tinnitus, vertigo; No sinus or throat pain  NECK: No pain or stiffness  RESPIRATORY: No cough, wheezing, chills or hemoptysis; No shortness of breath  CARDIOVASCULAR: No chest pain, palpitations, dizziness, or leg swelling  GASTROINTESTINAL: No abdominal or epigastric pain. No nausea, vomiting, or hematemesis; No diarrhea or constipation. No melena or hematochezia.  GENITOURINARY: No dysuria, frequency, hematuria, or incontinence  NEUROLOGICAL: No headaches, memory loss, loss of strength, numbness, or tremors  SKIN: No itching, burning, rashes, or lesions   LYMPH NODES: No enlarged glands  ENDOCRINE: No heat or cold intolerance; No hair loss  MUSCULOSKELETAL: chronic b/l knee pain  PSYCHIATRIC: No depression, anxiety, mood swings, or difficulty sleeping  HEME/LYMPH: No easy bruising, or bleeding gums  ALLERY AND IMMUNOLOGIC: No hives or eczema    RADIOLOGY & ADDITIONAL TESTS:    Imaging Personally Reviewed:  [ ] YES  [ ] NO    Consultant(s) Notes Reviewed:  [ ] YES  [ ] NO    PHYSICAL EXAM:  GENERAL: NAD, well-groomed, well-developed  HEAD:  Atraumatic, Normocephalic  EYES: EOMI, PERRLA, conjunctiva and sclera clear  ENMT: No tonsillar erythema, exudates, or enlargement; Moist mucous membranes, Good dentition, No lesions  NECK: Supple, No JVD, Normal thyroid  NERVOUS SYSTEM:  Alert & Oriented X3, Good concentration; Motor Strength 5/5 B/L upper and lower extremities; DTRs 2+ intact and symmetric  CHEST/LUNG: Clear to percussion bilaterally; No rales, rhonchi, wheezing, or rubs  HEART: Regular rate and rhythm; No murmurs, rubs, or gallops  ABDOMEN: Soft, Nontender, Nondistended; Bowel sounds present  EXTREMITIES:  left calf tender to palp  LYMPH: No lymphadenopathy noted  SKIN: No rashes or lesions    Care Discussed with Consultants/Other Providers [y ] YES  [ ] NO

## 2018-05-17 NOTE — DISCHARGE NOTE ADULT - PATIENT PORTAL LINK FT
You can access the [facility] Patient Portal, offered by [facility], by registering with the following website: [email or web address]

## 2018-05-17 NOTE — DISCHARGE NOTE ADULT - DURABLE MEDICAL EQUIPMENT AGENCY
Rolling walker and commode from [facility]-  [phone]

## 2018-05-18 LAB
HCT VFR BLD CALC: 30.3 % — LOW (ref 34.5–45)
HGB BLD-MCNC: 10.5 G/DL — LOW (ref 11.5–15.5)
SURGICAL PATHOLOGY FINAL REPORT - CH: SIGNIFICANT CHANGE UP

## 2018-05-18 RX ADMIN — Medication 50 MICROGRAM(S): at 05:05

## 2018-05-18 RX ADMIN — Medication 100 MILLIGRAM(S): at 21:13

## 2018-05-18 RX ADMIN — HYDROMORPHONE HYDROCHLORIDE 4 MILLIGRAM(S): 2 INJECTION INTRAMUSCULAR; INTRAVENOUS; SUBCUTANEOUS at 00:40

## 2018-05-18 RX ADMIN — Medication 325 MILLIGRAM(S): at 17:38

## 2018-05-18 RX ADMIN — HYDROMORPHONE HYDROCHLORIDE 4 MILLIGRAM(S): 2 INJECTION INTRAMUSCULAR; INTRAVENOUS; SUBCUTANEOUS at 06:09

## 2018-05-18 RX ADMIN — Medication 500 MILLIGRAM(S): at 17:39

## 2018-05-18 RX ADMIN — Medication 500 MILLIGRAM(S): at 06:09

## 2018-05-18 RX ADMIN — Medication 325 MILLIGRAM(S): at 11:39

## 2018-05-18 RX ADMIN — Medication 325 MILLIGRAM(S): at 06:09

## 2018-05-18 RX ADMIN — Medication 1 MILLIGRAM(S): at 11:39

## 2018-05-18 RX ADMIN — HYDROMORPHONE HYDROCHLORIDE 4 MILLIGRAM(S): 2 INJECTION INTRAMUSCULAR; INTRAVENOUS; SUBCUTANEOUS at 11:33

## 2018-05-18 RX ADMIN — HYDROMORPHONE HYDROCHLORIDE 4 MILLIGRAM(S): 2 INJECTION INTRAMUSCULAR; INTRAVENOUS; SUBCUTANEOUS at 21:13

## 2018-05-18 RX ADMIN — CELECOXIB 200 MILLIGRAM(S): 200 CAPSULE ORAL at 06:09

## 2018-05-18 RX ADMIN — Medication 100 MILLIGRAM(S): at 06:09

## 2018-05-18 RX ADMIN — HYDROMORPHONE HYDROCHLORIDE 4 MILLIGRAM(S): 2 INJECTION INTRAMUSCULAR; INTRAVENOUS; SUBCUTANEOUS at 22:30

## 2018-05-18 RX ADMIN — Medication 30 MILLILITER(S): at 12:27

## 2018-05-18 RX ADMIN — CELECOXIB 200 MILLIGRAM(S): 200 CAPSULE ORAL at 21:13

## 2018-05-18 RX ADMIN — Medication 100 MILLIGRAM(S): at 17:38

## 2018-05-18 RX ADMIN — AMLODIPINE BESYLATE 5 MILLIGRAM(S): 2.5 TABLET ORAL at 06:09

## 2018-05-18 RX ADMIN — HYDROMORPHONE HYDROCHLORIDE 4 MILLIGRAM(S): 2 INJECTION INTRAMUSCULAR; INTRAVENOUS; SUBCUTANEOUS at 05:05

## 2018-05-18 RX ADMIN — SENNA PLUS 2 TABLET(S): 8.6 TABLET ORAL at 21:14

## 2018-05-18 NOTE — PROGRESS NOTE ADULT - ATTENDING COMMENTS
pt pain well controled   pt will be d/c home tomorrow to follow up with pcp and ortho   home care pt

## 2018-05-18 NOTE — OCCUPATIONAL THERAPY INITIAL EVALUATION ADULT - LONG TERM MEMORY, REHAB EVAL
[address]            (For Outpatient Use Only) Initial Admit Date: 10/21/2017   Inpt/Obs Admit Date: Inpt: 10/21/17 / Obs: N/A   Discharge Date:    Hospital Acct:  [de-identified]   MRN: [de-identified]   CSN: [id number]        [facility] October 23, 2017 intact

## 2018-05-18 NOTE — PROGRESS NOTE ADULT - SUBJECTIVE AND OBJECTIVE BOX
VANDANAJUAN PABLO HAHN 77y Female  MRN-[id number]     ORTHOPEDIC SURGERY / DR. PAYTON    POD # 2    Vital Signs Last 24 Hrs  T(C): 36.7 (18 May 2018 04:41), Max: 37.8 (17 May 2018 23:35)  T(F): 98.1 (18 May 2018 04:41), Max: 100.1 (17 May 2018 23:35)  HR: 82 (18 May 2018 06:08) (69 - 93)  BP: 112/71 (18 May 2018 06:08) (111/68 - 160/67)  BP(mean): --  RR: 16 (18 May 2018 06:08) (16 - 17)  SpO2: 93% (18 May 2018 06:08) (93% - 98%)    LEFT KNEE :    DRESSING DRY AND INTACT  SOME EDEMA  GOOD MOTOR TO LEFT LOWER EXTREMITY  NEURO-VASCULAR STATUS INTACT  NO CALF TENDERNESS    Hemoglobin: 10.5 (05-18 @ 05:47)  Hemoglobin: 11.2 (05-17 @ 04:46)  Hemoglobin: 12.4 (05-16 @ 10:00)    Hematocrit: 30.3 (05-18 @ 05:47)  Hematocrit: 33.0 (05-17 @ 04:46)  Hematocrit: 38.0 (05-16 @ 10:00)    05-17    142  |  108  |  14  ----------------------------<  122<H>  4.7   |  27  |  0.53    Ca    7.7<L>      17 May 2018 04:45    ASSESSMENT &  PLAN:  POD # 2 S/P LEFT TOTAL KNEE  REPLACEMENT    WEIGHT  BEARING AS TOLERATED, OOB AND AMBULATE, PHYSICAL THERAPY   DVT PROPHYLAXIS  MG PO BID  INCENTIVE SPIROMETRY   DISCHARGE PLANNING TO HOME WITH HOME CARE IN AM

## 2018-05-18 NOTE — PROGRESS NOTE ADULT - SUBJECTIVE AND OBJECTIVE BOX
Patient is a 77y old  Female who presents with a chief complaint of "Left knee replacement" (17 May 2018 15:29)      INTERVAL HPI/OVERNIGHT EVENTS:  T(C): 37.1 (05-18-18 @ 15:57), Max: 37.8 (05-17-18 @ 23:35)  HR: 86 (05-18-18 @ 15:57) (80 - 93)  BP: 136/74 (05-18-18 @ 15:57) (111/68 - 173/73)  RR: 16 (05-18-18 @ 15:57) (16 - 17)  SpO2: 97% (05-18-18 @ 15:57) (93% - 98%)  Wt(kg): --  I&O's Summary      LABS:                        10.5   x     )-----------( x        ( 18 May 2018 05:47 )             30.3     05-17    142  |  108  |  14  ----------------------------<  122<H>  4.7   |  27  |  0.53    Ca    7.7<L>      17 May 2018 04:45          CAPILLARY BLOOD GLUCOSE                MEDICATIONS  (STANDING):  amLODIPine   Tablet 5 milliGRAM(s) Oral daily  ascorbic acid 500 milliGRAM(s) Oral two times a day  aspirin enteric coated 325 milliGRAM(s) Oral two times a day  celecoxib 200 milliGRAM(s) Oral two times a day  docusate sodium 100 milliGRAM(s) Oral three times a day  ferrous    sulfate 325 milliGRAM(s) Oral three times a day with meals  folic acid 1 milliGRAM(s) Oral daily  levothyroxine 50 MICROGram(s) Oral daily  morphine  - Injectable 4 milliGRAM(s) IV Push once    MEDICATIONS  (PRN):  acetaminophen   Tablet 650 milliGRAM(s) Oral every 6 hours PRN For Temp over 38.3 C (100.94 F)  aluminum hydroxide/magnesium hydroxide/simethicone Suspension 30 milliLiter(s) Oral four times a day PRN Indigestion  HYDROmorphone   Tablet 2 milliGRAM(s) Oral every 4 hours PRN Moderate Pain (4 - 6)  HYDROmorphone   Tablet 4 milliGRAM(s) Oral every 4 hours PRN Severe Pain (7 - 10)  ondansetron Injectable 4 milliGRAM(s) IV Push every 6 hours PRN Nausea and/or Vomiting  senna 2 Tablet(s) Oral at bedtime PRN Constipation      REVIEW OF SYSTEMS:  CONSTITUTIONAL: No fever, weight loss, or fatigue  EYES: No eye pain, visual disturbances, or discharge  ENMT:  No difficulty hearing, tinnitus, vertigo; No sinus or throat pain  NECK: No pain or stiffness  RESPIRATORY: No cough, wheezing, chills or hemoptysis; No shortness of breath  CARDIOVASCULAR: No chest pain, palpitations, dizziness, or leg swelling  GASTROINTESTINAL: No abdominal or epigastric pain. No nausea, vomiting, or hematemesis; No diarrhea or constipation. No melena or hematochezia.  LYMPH NODES: No enlarged glands  ENDOCRINE: No heat or cold intolerance; No hair loss  MUSCULOSKELETAL: No joint pain or swelling; No muscle, back, or extremity pain      RADIOLOGY & ADDITIONAL TESTS:    Imaging Personally Reviewed:  [ ] YES  [ ] NO    Consultant(s) Notes Reviewed:  [ ] YES  [ ] NO    PHYSICAL EXAM:  GENERAL: NAD, well-groomed, well-developed  HEAD:  Atraumatic, Normocephalic  EYES: EOMI, PERRLA, conjunctiva and sclera clear  ENMT: No tonsillar erythema, exudates, or enlargement; Moist mucous membranes, Good dentition, No lesions  NECK: Supple, No JVD, Normal thyroid  NERVOUS SYSTEM:  Alert & Oriented X3, Good concentration; Motor Strength 5/5 B/L upper and lower extremities; DTRs 2+ intact and symmetric  CHEST/LUNG: Clear to percussion bilaterally; No rales, rhonchi, wheezing, or rubs  HEART: Regular rate and rhythm; No murmurs, rubs, or gallops  EXTREMITIES:  2+ Peripheral Pulses, No clubbing, cyanosis, or edema      Care Discussed with Consultants/Other Providers [ ] YES  [ ] NO

## 2018-05-18 NOTE — OCCUPATIONAL THERAPY INITIAL EVALUATION ADULT - TRANSFER TRAINING, PT EVAL
Patient will transfer to toilet at a modified independent level and stall shower with DME as needed with supervision in 1-3 sessions.

## 2018-05-18 NOTE — OCCUPATIONAL THERAPY INITIAL EVALUATION ADULT - ADDITIONAL COMMENTS
Pt lives in a 1st level co-op with +curb to enter and no steps outside/inside co-op. Pt with +stall shower with built-in seat and grab bar. Pt does not own any DME.  Pt reports that her spouse can assist her at home with ADL's prn.

## 2018-05-19 VITALS
HEART RATE: 82 BPM | SYSTOLIC BLOOD PRESSURE: 130 MMHG | DIASTOLIC BLOOD PRESSURE: 73 MMHG | TEMPERATURE: 98 F | RESPIRATION RATE: 18 BRPM | OXYGEN SATURATION: 95 %

## 2018-05-19 RX ORDER — ASPIRIN/CALCIUM CARB/MAGNESIUM 324 MG
1 TABLET ORAL
Qty: 60 | Refills: 0 | OUTPATIENT
Start: 2018-05-19 | End: 2018-06-17

## 2018-05-19 RX ORDER — IBUPROFEN 200 MG
1 TABLET ORAL
Qty: 0 | Refills: 0 | COMMUNITY

## 2018-05-19 RX ORDER — CELECOXIB 200 MG/1
1 CAPSULE ORAL
Qty: 0 | Refills: 0 | COMMUNITY
Start: 2018-05-19

## 2018-05-19 RX ORDER — ASPIRIN/CALCIUM CARB/MAGNESIUM 324 MG
1 TABLET ORAL
Qty: 0 | Refills: 0 | COMMUNITY

## 2018-05-19 RX ORDER — HYDROMORPHONE HYDROCHLORIDE 2 MG/ML
1 INJECTION INTRAMUSCULAR; INTRAVENOUS; SUBCUTANEOUS
Qty: 20 | Refills: 0 | OUTPATIENT
Start: 2018-05-19 | End: 2018-05-23

## 2018-05-19 RX ADMIN — Medication 500 MILLIGRAM(S): at 05:38

## 2018-05-19 RX ADMIN — HYDROMORPHONE HYDROCHLORIDE 4 MILLIGRAM(S): 2 INJECTION INTRAMUSCULAR; INTRAVENOUS; SUBCUTANEOUS at 05:38

## 2018-05-19 RX ADMIN — AMLODIPINE BESYLATE 5 MILLIGRAM(S): 2.5 TABLET ORAL at 05:38

## 2018-05-19 RX ADMIN — Medication 325 MILLIGRAM(S): at 05:38

## 2018-05-19 RX ADMIN — Medication 50 MICROGRAM(S): at 05:38

## 2018-05-19 RX ADMIN — CELECOXIB 200 MILLIGRAM(S): 200 CAPSULE ORAL at 05:38

## 2018-05-19 RX ADMIN — Medication 100 MILLIGRAM(S): at 05:38

## 2018-05-19 NOTE — PROGRESS NOTE ADULT - SUBJECTIVE AND OBJECTIVE BOX
ALEXJUAN PABLO 77y Female  MRN-[id number]     ORTHOPEDIC SURGERY / DR. PAYTON    POD # 3    Vital Signs Last 24 Hrs  T(C): 37 (18 May 2018 23:36), Max: 37.2 (18 May 2018 08:19)  T(F): 98.6 (18 May 2018 23:36), Max: 98.9 (18 May 2018 08:19)  HR: 84 (19 May 2018 05:33) (80 - 88)  BP: 139/68 (19 May 2018 05:33) (112/66 - 173/73)  BP(mean): --  RR: 16 (19 May 2018 05:33) (16 - 16)  SpO2: 93% (19 May 2018 05:33) (93% - 98%)    LEFT KNEE :    WOUND DRY AND INTACT  SOME EDEMA AND ECCCHYMOSIS  GOOD MOTOR TO LEFT LOWER EXTREMITY  NEURO-VASCULAR STATUS INTACT  NO CALF TENDERNESS    Hemoglobin: 10.5 (05-18 @ 05:47)  Hemoglobin: 11.2 (05-17 @ 04:46)  Hemoglobin: 12.4 (05-16 @ 10:00)    Hematocrit: 30.3 (05-18 @ 05:47)  Hematocrit: 33.0 (05-17 @ 04:46)  Hematocrit: 38.0 (05-16 @ 10:00)    ASSESSMENT &  PLAN:  POD # 3 S/P LEFT TOTAL KNEE  REPLACEMENT    WEIGHT  BEARING AS TOLERATED, OOB AND AMBULATE, PHYSICAL THERAPY   DVT PROPHYLAXIS  MG PO BID  INCENTIVE SPIROMETRY   DISCHARGE PLANNING TO HOME WITH HOME CARE   NEW AQUACEL DRESSING APPLIED

## 2018-05-19 NOTE — PROGRESS NOTE ADULT - SUBJECTIVE AND OBJECTIVE BOX
Patient is a 77y old  Female who presents with a chief complaint of "Left knee replacement" (17 May 2018 15:29)      INTERVAL HPI/OVERNIGHT EVENTS: Patient seen and examined. NAD. No complaints.      Vital Signs Last 24 Hrs  T(C): 36.9 (19 May 2018 07:41), Max: 37.1 (18 May 2018 15:57)  T(F): 98.4 (19 May 2018 07:41), Max: 98.7 (18 May 2018 15:57)  HR: 82 (19 May 2018 07:41) (82 - 88)  BP: 130/73 (19 May 2018 07:41) (112/66 - 139/68)  BP(mean): --  RR: 18 (19 May 2018 07:41) (16 - 18)  SpO2: 95% (19 May 2018 07:41) (93% - 97%)                                10.5   x     )-----------( x        ( 18 May 2018 05:47 )             30.3       CAPILLARY BLOOD GLUCOSE                  acetaminophen   Tablet 650 milliGRAM(s) Oral every 6 hours PRN  aluminum hydroxide/magnesium hydroxide/simethicone Suspension 30 milliLiter(s) Oral four times a day PRN  amLODIPine   Tablet 5 milliGRAM(s) Oral daily  ascorbic acid 500 milliGRAM(s) Oral two times a day  aspirin enteric coated 325 milliGRAM(s) Oral two times a day  celecoxib 200 milliGRAM(s) Oral two times a day  docusate sodium 100 milliGRAM(s) Oral three times a day  ferrous    sulfate 325 milliGRAM(s) Oral three times a day with meals  folic acid 1 milliGRAM(s) Oral daily  HYDROmorphone   Tablet 2 milliGRAM(s) Oral every 4 hours PRN  HYDROmorphone   Tablet 4 milliGRAM(s) Oral every 4 hours PRN  levothyroxine 50 MICROGram(s) Oral daily  morphine  - Injectable 4 milliGRAM(s) IV Push once  ondansetron Injectable 4 milliGRAM(s) IV Push every 6 hours PRN  senna 2 Tablet(s) Oral at bedtime PRN      REVIEW OF SYSTEMS:  CONSTITUTIONAL: No fever, weight loss, or fatigue  EYES: No eye pain, visual disturbances, or discharge  ENMT:  No difficulty hearing, tinnitus, vertigo; No sinus or throat pain  NECK: No pain or stiffness  RESPIRATORY: No cough, wheezing, chills or hemoptysis; No shortness of breath  CARDIOVASCULAR: No chest pain, palpitations, dizziness, or leg swelling  GASTROINTESTINAL: No abdominal or epigastric pain. No nausea, vomiting, or hematemesis; No diarrhea or constipation. No melena or hematochezia.  LYMPH NODES: No enlarged glands  ENDOCRINE: No heat or cold intolerance; No hair loss  MUSCULOSKELETAL: No joint pain or swelling; No muscle, back, or extremity pain      PHYSICAL EXAM:  GENERAL: NAD, well-groomed, well-developed  HEAD:  Atraumatic, Normocephalic  EYES: EOMI, PERRLA, conjunctiva and sclera clear  ENMT: No tonsillar erythema, exudates, or enlargement; Moist mucous membranes, Good dentition, No lesions  NECK: Supple, No JVD, Normal thyroid  NERVOUS SYSTEM:  Alert & Oriented X3, Good concentration; Motor Strength 5/5 B/L upper and lower extremities; DTRs 2+ intact and symmetric  CHEST/LUNG: Clear to percussion bilaterally; No rales, rhonchi, wheezing, or rubs  HEART: Regular rate and rhythm; No murmurs, rubs, or gallops  EXTREMITIES:  2+ Peripheral Pulses, No clubbing, cyanosis, or edema    Advanced care planning discussed with patient/family. Advanced care planning forms reviewed/discussed/completed. 20 minutes spent.

## 2018-05-19 NOTE — PROGRESS NOTE ADULT - ATTENDING COMMENTS
pt pain well controlled   pt will be d/c home today to follow up with pcp and ortho   home care pt  medically stable

## 2018-06-16 NOTE — OCCUPATIONAL THERAPY INITIAL EVALUATION ADULT - DIAGNOSIS, OT EVAL
Patient with decreased ADL status and impairments with functional mobility. Post-application: Motor, sensory, and vascular responses intact in the injured extremity./The patient/caregiver verbalized understanding of how to care for the injured extremity with splint/Pre-application: Motor, sensory, and vascular responses intact in the injured extremity.

## 2018-09-28 PROCEDURE — C1889: CPT

## 2018-09-28 PROCEDURE — 80048 BASIC METABOLIC PNL TOTAL CA: CPT

## 2018-09-28 PROCEDURE — 85018 HEMOGLOBIN: CPT

## 2018-09-28 PROCEDURE — 97162 PT EVAL MOD COMPLEX 30 MIN: CPT

## 2018-09-28 PROCEDURE — 88311 DECALCIFY TISSUE: CPT

## 2018-09-28 PROCEDURE — C1776: CPT

## 2018-09-28 PROCEDURE — 97116 GAIT TRAINING THERAPY: CPT

## 2018-09-28 PROCEDURE — 88305 TISSUE EXAM BY PATHOLOGIST: CPT

## 2018-09-28 PROCEDURE — 73562 X-RAY EXAM OF KNEE 3: CPT

## 2018-09-28 PROCEDURE — 97535 SELF CARE MNGMENT TRAINING: CPT

## 2018-09-28 PROCEDURE — 97165 OT EVAL LOW COMPLEX 30 MIN: CPT

## 2018-09-28 PROCEDURE — C1713: CPT

## 2018-09-28 PROCEDURE — 93971 EXTREMITY STUDY: CPT

## 2018-09-28 PROCEDURE — 97530 THERAPEUTIC ACTIVITIES: CPT

## 2018-09-28 PROCEDURE — 85014 HEMATOCRIT: CPT

## 2019-12-18 NOTE — OCCUPATIONAL THERAPY INITIAL EVALUATION ADULT - TRANSFER SAFETY CONCERNS NOTED: SIT/STAND, REHAB EVAL
10/17/19 previous appointment future appointment 2/18/20 ok to refill per protocol.   decreased weight-shifting ability/losing balance

## 2022-01-19 NOTE — H&P PST ADULT - FAMILY HISTORY
Lily Wolff is a 83 year old female presenting for MWV.    Discuss frequent diarrhea, urgency.  Has been taking a probiotic, has been somewhat helpful.    Denies known Latex allergy or symptoms of Latex sensitivity.  Medications verified, no changes.  Health Maintenance Due   Topic Date Due   • Medicare Wellness Visit  12/23/2021   • Depression Screening  12/23/2021       Patient is due for the topics as listed above and wishes to proceed with them.   Patient would like communication of their results via:        Cell Phone:   Telephone Information:   Mobile [phone]     Okay to leave a message containing results? Yes     Father  Still living? No  CAD (coronary artery disease), Age at diagnosis: Age Unknown     Mother  Still living? No  Family history of ovarian cancer, Age at diagnosis: Age Unknown

## 2023-10-02 NOTE — DISCHARGE NOTE ADULT - ABILITY TO HEAR (WITH HEARING AID OR HEARING APPLIANCE IF NORMALLY USED):
Adequate: hears normal conversation without difficulty Detail Level: Detailed Quality 226: Preventive Care And Screening: Tobacco Use: Screening And Cessation Intervention: Patient screened for tobacco use and is an ex/non-smoker Quality 130: Documentation Of Current Medications In The Medical Record: Current Medications Documented

## 2025-02-05 ENCOUNTER — OFFICE (OUTPATIENT)
Dept: URBAN - METROPOLITAN AREA CLINIC 109 | Facility: CLINIC | Age: 85
Setting detail: OPHTHALMOLOGY
End: 2025-02-05
Payer: MEDICARE

## 2025-02-05 ENCOUNTER — RX ONLY (RX ONLY)
Age: 85
End: 2025-02-05

## 2025-02-05 DIAGNOSIS — H40.013: ICD-10-CM

## 2025-02-05 DIAGNOSIS — H25.13: ICD-10-CM

## 2025-02-05 DIAGNOSIS — H35.3132: ICD-10-CM

## 2025-02-05 DIAGNOSIS — H43.392: ICD-10-CM

## 2025-02-05 DIAGNOSIS — H16.223: ICD-10-CM

## 2025-02-05 PROCEDURE — 99204 OFFICE O/P NEW MOD 45 MIN: CPT | Performed by: OPHTHALMOLOGY

## 2025-02-05 PROCEDURE — 92201 OPSCPY EXTND RTA DRAW UNI/BI: CPT | Performed by: OPHTHALMOLOGY

## 2025-02-05 PROCEDURE — 92133 CPTRZD OPH DX IMG PST SGM ON: CPT | Performed by: OPHTHALMOLOGY

## 2025-02-05 PROCEDURE — 92020 GONIOSCOPY: CPT | Performed by: OPHTHALMOLOGY

## 2025-02-05 ASSESSMENT — PACHYMETRY
OD_CT_UM: 534
OD_CT_CORRECTION: 1
OS_CT_CORRECTION: 1
OS_CT_UM: 532

## 2025-02-05 ASSESSMENT — SUPERFICIAL PUNCTATE KERATITIS (SPK)
OS_SPK: T
OD_SPK: T

## 2025-02-05 ASSESSMENT — REFRACTION_MANIFEST
OS_CYLINDER: -1.50
OD_SPHERE: +2.00
OS_VA1: 20/25-2
OD_CYLINDER: -1.25
OD_VA1: 20/25-2
OD_AXIS: 113
OS_AXIS: 080
OS_SPHERE: +2.00

## 2025-02-05 ASSESSMENT — REFRACTION_CURRENTRX
OD_VPRISM_DIRECTION: SV
OS_AXIS: 165
OD_OVR_VA: 20/
OD_SPHERE: +4.25
OS_CYLINDER: -0.25
OS_OVR_VA: 20/
OS_SPHERE: +4.50
OS_VPRISM_DIRECTION: SV
OD_CYLINDER: -0.50
OD_AXIS: 09

## 2025-02-05 ASSESSMENT — KERATOMETRY
OS_AXISANGLE_DEGREES: 130
OD_K1POWER_DIOPTERS: 43.25
OD_K2POWER_DIOPTERS: 43.75
OS_K1POWER_DIOPTERS: 43.25
OD_AXISANGLE_DEGREES: 059
OS_K2POWER_DIOPTERS: 44.00

## 2025-02-05 ASSESSMENT — REFRACTION_AUTOREFRACTION
OS_CYLINDER: -1.75
OD_AXIS: 113
OD_SPHERE: +1.75
OS_SPHERE: +2.00
OD_CYLINDER: -1.25
OS_AXIS: 81

## 2025-02-05 ASSESSMENT — CONFRONTATIONAL VISUAL FIELD TEST (CVF)
OD_FINDINGS: FULL
OS_FINDINGS: FULL

## 2025-02-05 ASSESSMENT — TONOMETRY
OD_IOP_MMHG: 17
OS_IOP_MMHG: 15

## 2025-02-05 ASSESSMENT — VISUAL ACUITY
OS_BCVA: 20/80-1
OD_BCVA: 20/60-2